# Patient Record
Sex: MALE | Race: WHITE | NOT HISPANIC OR LATINO | URBAN - METROPOLITAN AREA
[De-identification: names, ages, dates, MRNs, and addresses within clinical notes are randomized per-mention and may not be internally consistent; named-entity substitution may affect disease eponyms.]

---

## 2021-01-28 ENCOUNTER — TELEPHONE ENCOUNTER (OUTPATIENT)
Dept: URBAN - METROPOLITAN AREA CLINIC 113 | Facility: CLINIC | Age: 26
End: 2021-01-28

## 2021-02-05 ENCOUNTER — OFFICE VISIT (OUTPATIENT)
Dept: URBAN - METROPOLITAN AREA CLINIC 72 | Facility: CLINIC | Age: 26
End: 2021-02-05
Payer: COMMERCIAL

## 2021-02-05 ENCOUNTER — WEB ENCOUNTER (OUTPATIENT)
Dept: URBAN - METROPOLITAN AREA CLINIC 72 | Facility: CLINIC | Age: 26
End: 2021-02-05

## 2021-02-05 VITALS
HEART RATE: 107 BPM | WEIGHT: 114.6 LBS | RESPIRATION RATE: 17 BRPM | OXYGEN SATURATION: 98 % | DIASTOLIC BLOOD PRESSURE: 80 MMHG | HEIGHT: 67 IN | SYSTOLIC BLOOD PRESSURE: 117 MMHG | BODY MASS INDEX: 17.99 KG/M2 | TEMPERATURE: 97.7 F

## 2021-02-05 DIAGNOSIS — K50.819 CROHN'S DISEASE OF SMALL AND LARGE INTESTINES WITH COMPLICATION: ICD-10-CM

## 2021-02-05 PROCEDURE — G8483 FLU IMM NO ADMIN DOC REA: HCPCS | Performed by: INTERNAL MEDICINE

## 2021-02-05 PROCEDURE — 99204 OFFICE O/P NEW MOD 45 MIN: CPT | Performed by: INTERNAL MEDICINE

## 2021-02-05 PROCEDURE — G8427 DOCREV CUR MEDS BY ELIG CLIN: HCPCS | Performed by: INTERNAL MEDICINE

## 2021-02-05 PROCEDURE — G8418 CALC BMI BLW LOW PARAM F/U: HCPCS | Performed by: INTERNAL MEDICINE

## 2021-02-05 PROCEDURE — 1036F TOBACCO NON-USER: CPT | Performed by: INTERNAL MEDICINE

## 2021-02-05 RX ORDER — PREDNISONE 10 MG/1
AS DIRECTED TABLET ORAL ONCE A DAY
Qty: 150 | Refills: 1 | OUTPATIENT
Start: 2021-02-05 | End: 2021-08-04

## 2021-02-05 NOTE — HPI-TODAY'S VISIT:
Mr. Abbott is a pleasant 25-year-old male who presents as a referral from Dr. Yassine Meyers to establish care for Crohn's disease.  CT scan performed in the emergency department without contrast showed increased intraluminal colonic fluid with multiple colonic air-fluid levels moderate multifocal diffuse colonic distention involving the ascending, transverse, focal concentric thickening in the sigmoid colon and nonspecific gastric distention  he was diagnosed with Crohn's disease many years ago, initially placed on Remicade but all loss.  Source.  Has been trying to maintain with diet.  Notes that this flare in particular began in November, he began experiencing abdominal distention, epigastric pain abdominal pain and weight loss.  He normally is 155 pounds, he is currently down to the wait list of low.  He states that his lowest he was roughly 105 pounds.  He was placed on prednisone he reports that he is feeling somewhat better but still having significant bloating.  He is on a bland low residue diet.  His pain is well controlled currently.bowels are solid currently.  He is on 40 mg of prednisone daily.  Lab work from 1/26/2021 WBC 6.8 hgb 13 Platelet count 526 CMP normal

## 2021-02-05 NOTE — EXAM-PHYSICAL EXAM
General--no acute distress, normal appearance Eyes--anicteric, no pallor HENT--normocephalic, atraumatic head Neck--no lymphadenopathy, non tender Chest--normal breath sounds, equal chest rise Heart--regular rate and rhythm Abdomen--soft, non tender, non distended, bowel sounds present, no organomegaly Musculoskeletal--normal gait and station Skin--no rashes, no jaundice Neurologic--Alert and oriented x 3, answers questions appropriately Psychiatric--stable mood, appropriate affect

## 2021-02-07 ENCOUNTER — WEB ENCOUNTER (OUTPATIENT)
Dept: URBAN - METROPOLITAN AREA CLINIC 72 | Facility: CLINIC | Age: 26
End: 2021-02-07

## 2021-02-11 ENCOUNTER — WEB ENCOUNTER (OUTPATIENT)
Dept: URBAN - METROPOLITAN AREA CLINIC 72 | Facility: CLINIC | Age: 26
End: 2021-02-11

## 2021-03-05 ENCOUNTER — OFFICE VISIT (OUTPATIENT)
Dept: URBAN - METROPOLITAN AREA CLINIC 72 | Facility: CLINIC | Age: 26
End: 2021-03-05
Payer: COMMERCIAL

## 2021-03-05 VITALS
BODY MASS INDEX: 17.89 KG/M2 | DIASTOLIC BLOOD PRESSURE: 75 MMHG | WEIGHT: 114 LBS | HEIGHT: 67 IN | HEART RATE: 94 BPM | TEMPERATURE: 98 F | SYSTOLIC BLOOD PRESSURE: 111 MMHG

## 2021-03-05 DIAGNOSIS — K50.819 CROHN'S DISEASE OF SMALL AND LARGE INTESTINES WITH COMPLICATION: ICD-10-CM

## 2021-03-05 PROCEDURE — 99213 OFFICE O/P EST LOW 20 MIN: CPT | Performed by: INTERNAL MEDICINE

## 2021-03-05 RX ORDER — PREDNISONE 1 MG/1
2 TABLETS WITH FOOD OR MILK TABLET ORAL ONCE A DAY
Qty: 90 | Refills: 0 | OUTPATIENT
Start: 2021-03-05 | End: 2021-04-04

## 2021-03-05 RX ORDER — ADALIMUMAB 40MG/0.4ML
0.4 ML KIT SUBCUTANEOUS
Status: ACTIVE | COMMUNITY

## 2021-03-05 RX ORDER — PREDNISONE 10 MG/1
AS DIRECTED TABLET ORAL ONCE A DAY
Qty: 150 | Refills: 1 | Status: ACTIVE | COMMUNITY
Start: 2021-02-05 | End: 2021-08-04

## 2021-03-05 NOTE — HPI-TODAY'S VISIT:
Mr. Abbott returns for follow-up.  25-year-old male with history of Crohn's disease of the large and small intestine with complication of stricturing.  He was last in our office on 2/5/2021 and patient.  A CT scan performed in the emergency department without contrast that showed increasing intraluminal colonic fluid with multiple colonic air-fluid levels and moderate focal diffuse colonic distention involving the ascending, transverse colon, there was also focal concentric thickening in the sigmoid colon and nonspecific gastric distention.  He was diagnosed with Crohn's many years ago and was initially treated with Remicade but lost his pain or source therefore went off medication.  He has been following a very bland diet to manage his disease.  He notes that he has been a Crohn's flare since November, by abdominal distention, epigastric pain and weight loss.  He normally weighs around 155 pounds and we saw him was 114 pounds, he states that his slow S2 is 105 pounds.  He has a placed on prednisone and reports that he has had some significant improvement but is still having some bloating.  We advised that he continue his prednisone and were working to try to get him some financial assistance for steroid sparing therapy.  he is still taking 40 mg prednisone, he started his Humira and is still loading, plan to begin taper after he takes his first maintenance dose, this should be around March 17.  He placed himself on a carnivore diet, he is only eating meat he notes some improvement his weight has remained stable.  2/8/2021 TP [facility] genetic testing normal Infliximab level 0.4 Infliximab antibody less than 22 QuantiFERON Gold negative Hepatitis B surface antigen negative CRP 31 (high)

## 2021-03-19 ENCOUNTER — TELEPHONE ENCOUNTER (OUTPATIENT)
Dept: URBAN - METROPOLITAN AREA CLINIC 72 | Facility: CLINIC | Age: 26
End: 2021-03-19

## 2021-03-30 ENCOUNTER — TELEPHONE ENCOUNTER (OUTPATIENT)
Dept: URBAN - METROPOLITAN AREA CLINIC 72 | Facility: CLINIC | Age: 26
End: 2021-03-30

## 2021-03-30 RX ORDER — PREDNISONE 1 MG/1
2 TABLETS WITH FOOD OR MILK TABLET ORAL ONCE A DAY
Qty: 90 | Refills: 0
Start: 2021-03-05

## 2021-04-09 ENCOUNTER — OFFICE VISIT (OUTPATIENT)
Dept: URBAN - METROPOLITAN AREA CLINIC 72 | Facility: CLINIC | Age: 26
End: 2021-04-09

## 2021-04-27 ENCOUNTER — TELEPHONE ENCOUNTER (OUTPATIENT)
Dept: URBAN - METROPOLITAN AREA CLINIC 72 | Facility: CLINIC | Age: 26
End: 2021-04-27

## 2021-04-27 RX ORDER — PREDNISONE 1 MG/1
2 TABLETS WITH FOOD OR MILK TABLET ORAL ONCE A DAY
Qty: 30 | Refills: 0
Start: 2021-03-05 | End: 2021-05-27

## 2021-04-29 ENCOUNTER — TELEPHONE ENCOUNTER (OUTPATIENT)
Dept: URBAN - METROPOLITAN AREA SURGERY CENTER 30 | Facility: SURGERY CENTER | Age: 26
End: 2021-04-29

## 2021-04-29 RX ORDER — PREDNISONE 10 MG/1
AS DIRECTED TABLET ORAL ONCE A DAY
Qty: 150 | Refills: 0 | OUTPATIENT

## 2021-05-05 ENCOUNTER — TELEPHONE ENCOUNTER (OUTPATIENT)
Dept: URBAN - METROPOLITAN AREA CLINIC 113 | Facility: CLINIC | Age: 26
End: 2021-05-05

## 2021-05-05 RX ORDER — PREDNISONE 10 MG/1
4 TABLETS TABLET ORAL ONCE A DAY
Qty: 150 TABLET | Refills: 0
End: 2021-08-03

## 2021-05-07 ENCOUNTER — OFFICE VISIT (OUTPATIENT)
Dept: URBAN - METROPOLITAN AREA CLINIC 72 | Facility: CLINIC | Age: 26
End: 2021-05-07
Payer: COMMERCIAL

## 2021-05-07 VITALS
HEART RATE: 100 BPM | TEMPERATURE: 98.6 F | BODY MASS INDEX: 20.28 KG/M2 | RESPIRATION RATE: 18 BRPM | WEIGHT: 129.2 LBS | HEIGHT: 67 IN | SYSTOLIC BLOOD PRESSURE: 122 MMHG | DIASTOLIC BLOOD PRESSURE: 81 MMHG

## 2021-05-07 DIAGNOSIS — K50.819 CROHN'S DISEASE OF SMALL AND LARGE INTESTINES WITH COMPLICATION: ICD-10-CM

## 2021-05-07 PROCEDURE — 99213 OFFICE O/P EST LOW 20 MIN: CPT | Performed by: INTERNAL MEDICINE

## 2021-05-07 RX ORDER — PREDNISONE 1 MG/1
2 TABLETS WITH FOOD OR MILK TABLET ORAL ONCE A DAY
Qty: 30 | Refills: 0 | Status: DISCONTINUED | COMMUNITY
Start: 2021-03-05 | End: 2021-05-27

## 2021-05-07 RX ORDER — PREDNISONE 10 MG/1
3 TABLETS TABLET ORAL ONCE A DAY
Qty: 270 TABLET | Refills: 0 | Status: ACTIVE | COMMUNITY

## 2021-05-07 RX ORDER — ADALIMUMAB 40MG/0.4ML
0.4 ML KIT SUBCUTANEOUS
Status: ACTIVE | COMMUNITY

## 2021-05-07 NOTE — HPI-TODAY'S VISIT:
Mr. Abbott returns for follow-up.  He is a pleasant 26-year-old male with history of Crohn disease of the large and small intestine complicated by stricture.  He was diagnosed with Crohn disease many years ago, initially treated with Remicade however due to lack of payment lost the ability to afford medication.  He therefore treated his condition with diet.  He reports that he has had significant abdominal pain since November 2020, complicated by distention epigastric pain and significant weight loss.  He typically weighs around 155 pounds in load began seeing him he was 114 pounds.  He was seen in the emergency department placed on prednisone and reported significant improvement.  We last saw him he was on 40 mg of prednisone.  He has had issues with affording medications however we were able to get him Humira, he had started this only saw him on 3/5/2021 that was in the loading phase and had not began his maintenance dose. While waiting for his Humira to take effect we have him on steroids with plan to wean. HE is down to 35mg daily. HE is still on carnivore diet. His weight is up to 129lbs from 114lbs.   our workup has been relatively limited secondary to lack of pain or source.  They're working on insurance.  He is feeling somewhat better, still has episodes of bloating, unable to advance diet beyond meats.  Laboratory review: 2/8/2021: T[facility] T genetic testing normal Infliximab level 0.4 Infliximab antibody less than 22 quant gold negative Hepatitis B antigen negative CRP 31

## 2021-05-07 NOTE — EXAM-PHYSICAL EXAM
Patient wearing mask due to COVID-19  General--no acute distress, resting comfortably Eyes--anicteric, no pallor HENT--normocephalic, atraumatic head Neck--no lymphadenopathy, non tender Chest--normal breath sounds, equal rise Heart--regular rate and rhythm Abdomen--soft, non tender, non distended, bowel sounds present, no organomegalyRama increased bowel sounds but no pain

## 2021-06-08 ENCOUNTER — TELEPHONE ENCOUNTER (OUTPATIENT)
Dept: URBAN - METROPOLITAN AREA CLINIC 72 | Facility: CLINIC | Age: 26
End: 2021-06-08

## 2021-06-08 RX ORDER — PREDNISONE 10 MG/1
3 TABLETS TABLET ORAL ONCE A DAY
Qty: 270 TABLET | Refills: 0
End: 2021-09-06

## 2021-06-17 ENCOUNTER — TELEPHONE ENCOUNTER (OUTPATIENT)
Dept: URBAN - METROPOLITAN AREA CLINIC 113 | Facility: CLINIC | Age: 26
End: 2021-06-17

## 2021-06-25 ENCOUNTER — OFFICE VISIT (OUTPATIENT)
Dept: URBAN - METROPOLITAN AREA CLINIC 72 | Facility: CLINIC | Age: 26
End: 2021-06-25

## 2021-06-29 ENCOUNTER — CLAIMS CREATED FROM THE CLAIM WINDOW (OUTPATIENT)
Dept: URBAN - METROPOLITAN AREA MEDICAL CENTER 19 | Facility: MEDICAL CENTER | Age: 26
End: 2021-06-29
Payer: COMMERCIAL

## 2021-06-29 DIAGNOSIS — K50.812 CROHN'S DISEASE OF BOTH SMALL AND LARGE INTESTINE WITH INTESTINAL OBSTRUCTION: ICD-10-CM

## 2021-06-29 DIAGNOSIS — E27.40 ADRENAL INSUFFICIENCY: ICD-10-CM

## 2021-06-29 PROCEDURE — 99254 IP/OBS CNSLTJ NEW/EST MOD 60: CPT | Performed by: INTERNAL MEDICINE

## 2021-08-24 ENCOUNTER — LAB OUTSIDE AN ENCOUNTER (OUTPATIENT)
Dept: URBAN - METROPOLITAN AREA CLINIC 113 | Facility: CLINIC | Age: 26
End: 2021-08-24

## 2021-08-24 ENCOUNTER — OFFICE VISIT (OUTPATIENT)
Dept: URBAN - METROPOLITAN AREA CLINIC 113 | Facility: CLINIC | Age: 26
End: 2021-08-24
Payer: COMMERCIAL

## 2021-08-24 VITALS
HEART RATE: 115 BPM | WEIGHT: 148 LBS | DIASTOLIC BLOOD PRESSURE: 84 MMHG | TEMPERATURE: 98.7 F | RESPIRATION RATE: 20 BRPM | HEIGHT: 67 IN | BODY MASS INDEX: 23.23 KG/M2 | SYSTOLIC BLOOD PRESSURE: 123 MMHG

## 2021-08-24 DIAGNOSIS — K50.819 CROHN'S DISEASE OF SMALL AND LARGE INTESTINES WITH COMPLICATION: ICD-10-CM

## 2021-08-24 PROCEDURE — 99214 OFFICE O/P EST MOD 30 MIN: CPT | Performed by: INTERNAL MEDICINE

## 2021-08-24 RX ORDER — PREDNISONE 10 MG/1
3 TABLETS TABLET ORAL ONCE A DAY
Qty: 270 TABLET | Refills: 0 | Status: ON HOLD | COMMUNITY
End: 2021-09-06

## 2021-08-24 RX ORDER — ADALIMUMAB 40MG/0.4ML
0.4 ML KIT SUBCUTANEOUS
Status: ACTIVE | COMMUNITY

## 2021-08-24 NOTE — HPI-TODAY'S VISIT:
Patient is a very pleasant 26-year-old gentleman who I recently saw was a second opinion at [facility] in regards to Crohn's disease with small bowel obstruction.  He had been diagnosed with Crohn's disease 7 years ago.  He was initially on Remicade and had done well on this.  Then he was started on Humira.  He had been doing a lot of self managing of prednisone at home.  He had been followed by Dr. Darden prior to my seeing him.  We will check Humira levels in the hospital..  Humira level was close to therapeutic at 10.  No antibodies were seen. The patient states has been doing very well since surgery.  He is completely off prednisone.  He is no longer on Humira.  From listening to him it sounds like his mother had Crohn's colitis.  Patient was more specific about his disease course over the years.  He states he was diagnosed at age 28 colonoscopy.  He states he is not had a colonoscopy since that time.  He states he did well on Remicade until he could no longer receive it due to lack of patient assistance.  He only recently started Humira this year but this was after he had longstanding symptoms and had decreased his diet really to just a liquid diet. The patient surgical history this year was that he had surgery in June of this year for small bowel resection with primary anastomosis and stricturoplasty.  He was transferred here due to small bowel obstruction and pneumatosis.  He underwent exploratory lap with ostomy creation.

## 2021-09-10 ENCOUNTER — CLAIMS CREATED FROM THE CLAIM WINDOW (OUTPATIENT)
Dept: URBAN - METROPOLITAN AREA CLINIC 4 | Facility: CLINIC | Age: 26
End: 2021-09-10
Payer: COMMERCIAL

## 2021-09-10 ENCOUNTER — OFFICE VISIT (OUTPATIENT)
Dept: URBAN - METROPOLITAN AREA SURGERY CENTER 25 | Facility: SURGERY CENTER | Age: 26
End: 2021-09-10
Payer: COMMERCIAL

## 2021-09-10 DIAGNOSIS — K63.89 POLYP, HYPERPLASTIC: ICD-10-CM

## 2021-09-10 DIAGNOSIS — K50.80 CROHN'S DISEASE COLON: ICD-10-CM

## 2021-09-10 PROCEDURE — 88305 TISSUE EXAM BY PATHOLOGIST: CPT | Performed by: PATHOLOGY

## 2021-09-10 PROCEDURE — G8907 PT DOC NO EVENTS ON DISCHARG: HCPCS | Performed by: INTERNAL MEDICINE

## 2021-09-10 PROCEDURE — 44382 SMALL BOWEL ENDOSCOPY: CPT | Performed by: INTERNAL MEDICINE

## 2021-09-10 PROCEDURE — 45380 COLONOSCOPY AND BIOPSY: CPT | Performed by: INTERNAL MEDICINE

## 2021-09-10 RX ORDER — ADALIMUMAB 40MG/0.4ML
0.4 ML KIT SUBCUTANEOUS
Status: ACTIVE | COMMUNITY

## 2021-11-10 ENCOUNTER — OFFICE VISIT (OUTPATIENT)
Dept: URBAN - METROPOLITAN AREA CLINIC 113 | Facility: CLINIC | Age: 26
End: 2021-11-10
Payer: COMMERCIAL

## 2021-11-10 VITALS
RESPIRATION RATE: 20 BRPM | HEART RATE: 100 BPM | BODY MASS INDEX: 22.84 KG/M2 | DIASTOLIC BLOOD PRESSURE: 77 MMHG | TEMPERATURE: 98 F | WEIGHT: 145.5 LBS | HEIGHT: 67 IN | SYSTOLIC BLOOD PRESSURE: 124 MMHG

## 2021-11-10 DIAGNOSIS — K50.819 CROHN'S DISEASE OF SMALL AND LARGE INTESTINES WITH COMPLICATION: ICD-10-CM

## 2021-11-10 PROCEDURE — 99214 OFFICE O/P EST MOD 30 MIN: CPT | Performed by: INTERNAL MEDICINE

## 2021-11-10 RX ORDER — ADALIMUMAB 40MG/0.4ML
0.4 ML KIT SUBCUTANEOUS
Status: ACTIVE | COMMUNITY

## 2021-11-10 NOTE — EXAM-PHYSICAL EXAM
He is alert and oriented to person place and situation no acute distress.  There is no scleral icterus.  He has a small eschar over his prior wound otherwise his abdomen is soft with well-healed surgical wounds.

## 2021-11-10 NOTE — HPI-TODAY'S VISIT:
Patient is a very pleasant 26-year-old gentleman who I recently saw was a second opinion at [facility] in regards to Crohn's disease with small bowel obstruction.  He had been diagnosed with Crohn's disease 7 years ago.  He was initially on Remicade and had done well on this.  Then he was started on Humira.  He had been doing a lot of self managing of prednisone at home.  He had been followed by Dr. Darden prior to my seeing him.  We will check Humira levels in the hospital..  Humira level was close to therapeutic at 10.  No antibodies were seen. The patient states has been doing very well since surgery.  He is completely off prednisone.  He is no longer on Humira.  From listening to him it sounds like his mother had Crohn's colitis.  Patient was more specific about his disease course over the years.  He states he was diagnosed at age 28 colonoscopy.  He states he is not had a colonoscopy since that time.  He states he did well on Remicade until he could no longer receive it due to lack of patient assistance.  He only recently started Humira this year but this was after he had longstanding symptoms and had decreased his diet really to just a liquid diet. The patient surgical history this year was that he had surgery in June of this year for small bowel resection with primary anastomosis and stricturoplasty.  He was transferred here due to small bowel obstruction and pneumatosis.  He underwent exploratory lap with ostomy creation. Colonoscopy performed September 10 revealed mildly erythematous with occasional apathy in the entire colon.  Biopsies were obtained.  The distal ileum appeared normal.  Pathology though was unremarkable.  The apathy of the colon were considered prep effect and not evidence for Crohn's disease. Interval history.  The patient has completed reanastomosis.  He is doing well.  He only has a small eschar over the prior ileostomy site.  He has not restarted his Humira as of yet.

## 2021-12-21 ENCOUNTER — TELEPHONE ENCOUNTER (OUTPATIENT)
Dept: URBAN - METROPOLITAN AREA CLINIC 113 | Facility: CLINIC | Age: 26
End: 2021-12-21

## 2021-12-21 RX ORDER — ADALIMUMAB 40MG/0.4ML
1 SUBQ  Q 2 WEEKS KIT SUBCUTANEOUS
Qty: 6 | Refills: 3 | OUTPATIENT
Start: 2021-12-21 | End: 2022-12-16

## 2021-12-21 RX ORDER — ADALIMUMAB 80MG/0.8ML
80MG DAY 1, DAY 2, DAY 15 KIT SUBCUTANEOUS
Qty: 240 MILLIGRAM | Refills: 0 | OUTPATIENT
Start: 2021-12-21 | End: 2022-01-05

## 2021-12-27 ENCOUNTER — WEB ENCOUNTER (OUTPATIENT)
Dept: URBAN - METROPOLITAN AREA CLINIC 113 | Facility: CLINIC | Age: 26
End: 2021-12-27

## 2021-12-27 LAB
HEP B CORE AB, TOT: NEGATIVE
QUANTIFERON CRITERIA: (no result)
QUANTIFERON INCUBATION: (no result)
QUANTIFERON MITOGEN VALUE: >10
QUANTIFERON NIL VALUE: 0.01
QUANTIFERON TB1 AG VALUE: 0.05
QUANTIFERON TB2 AG VALUE: 0.05
QUANTIFERON-TB GOLD PLUS: NEGATIVE

## 2022-01-03 ENCOUNTER — WEB ENCOUNTER (OUTPATIENT)
Dept: URBAN - METROPOLITAN AREA CLINIC 113 | Facility: CLINIC | Age: 27
End: 2022-01-03

## 2022-01-05 ENCOUNTER — WEB ENCOUNTER (OUTPATIENT)
Dept: URBAN - METROPOLITAN AREA CLINIC 113 | Facility: CLINIC | Age: 27
End: 2022-01-05

## 2022-01-06 ENCOUNTER — WEB ENCOUNTER (OUTPATIENT)
Dept: URBAN - METROPOLITAN AREA CLINIC 113 | Facility: CLINIC | Age: 27
End: 2022-01-06

## 2022-01-06 RX ORDER — ADALIMUMAB 40MG/0.4ML
0.4 ML KIT SUBCUTANEOUS
Qty: 6 | Refills: 3

## 2022-01-06 RX ORDER — ADALIMUMAB 80MG/0.8ML
80 MG/0.8 ML KIT SUBCUTANEOUS
Qty: 3 | Refills: 0
Start: 2021-12-21 | End: 2022-01-10

## 2022-01-07 ENCOUNTER — WEB ENCOUNTER (OUTPATIENT)
Dept: URBAN - METROPOLITAN AREA CLINIC 113 | Facility: CLINIC | Age: 27
End: 2022-01-07

## 2022-03-11 ENCOUNTER — OFFICE VISIT (OUTPATIENT)
Dept: URBAN - METROPOLITAN AREA CLINIC 113 | Facility: CLINIC | Age: 27
End: 2022-03-11
Payer: COMMERCIAL

## 2022-03-11 VITALS
DIASTOLIC BLOOD PRESSURE: 79 MMHG | HEART RATE: 89 BPM | BODY MASS INDEX: 27.31 KG/M2 | TEMPERATURE: 97.8 F | HEIGHT: 67 IN | SYSTOLIC BLOOD PRESSURE: 132 MMHG | WEIGHT: 174 LBS

## 2022-03-11 DIAGNOSIS — K50.819 CROHN'S DISEASE OF SMALL AND LARGE INTESTINES WITH COMPLICATION: ICD-10-CM

## 2022-03-11 PROCEDURE — 99213 OFFICE O/P EST LOW 20 MIN: CPT | Performed by: INTERNAL MEDICINE

## 2022-03-11 RX ORDER — ADALIMUMAB 40MG/0.4ML
0.4 ML KIT SUBCUTANEOUS
Qty: 6 | Refills: 3 | Status: ACTIVE | COMMUNITY

## 2022-03-11 NOTE — HPI-TODAY'S VISIT:
Patient is a very pleasant 26-year-old gentleman who I recently saw was a second opinion at [facility] in regards to Crohn's disease with small bowel obstruction.  He had been diagnosed with Crohn's disease 7 years ago.  He was initially on Remicade and had done well on this.  Then he was started on Humira.  He had been doing a lot of self managing of prednisone at home.  He had been followed by Dr. Darden prior to my seeing him.  We will check Humira levels in the hospital..  Humira level was close to therapeutic at 10.  No antibodies were seen. The patient states has been doing very well since surgery.  He is completely off prednisone.  He is no longer on Humira.  From listening to him it sounds like his mother had Crohn's colitis.  Patient was more specific about his disease course over the years.  He states he was diagnosed at age 28 colonoscopy.  He states he is not had a colonoscopy since that time.  He states he did well on Remicade until he could no longer receive it due to lack of patient assistance.  He only recently started Humira this year but this was after he had longstanding symptoms and had decreased his diet really to just a liquid diet. The patient surgical history this year was that he had surgery in June of this year for small bowel resection with primary anastomosis and stricturoplasty.  He was transferred here due to small bowel obstruction and pneumatosis.  He underwent exploratory lap with ostomy creation. Colonoscopy performed September 10 revealed mildly erythematous with occasional apathy in the entire colon.  Biopsies were obtained.  The distal ileum appeared normal.  Pathology though was unremarkable.  The apathy of the colon were considered prep effect and not evidence for Crohn's disease. Interval history.  The patient has completed reanastomosis.  He is doing well.  He only has a small eschar over the prior ileostomy site.  He has not restarted his Humira as of yet. Interval history.  QuantiFERON gold in December was negative.  TPMT testing was negative. The patient is doing very well currently.  He is on Humira.  He decided not to use Imuran.  He and his wife talked it over and they did not like the risks involved and so elected not to proceed with Imuran dosing.  They asked a number of questions in regards to heavy lifting.  I answered all of their questions.

## 2022-12-20 ENCOUNTER — ERX REFILL RESPONSE (OUTPATIENT)
Dept: URBAN - METROPOLITAN AREA CLINIC 113 | Facility: CLINIC | Age: 27
End: 2022-12-20

## 2022-12-20 RX ORDER — ADALIMUMAB 40MG/0.4ML
INJECT 1 PEN UNDER THE SKIN EVERY 14 DAYS KIT SUBCUTANEOUS
Qty: 2 | Refills: 2 | OUTPATIENT

## 2022-12-20 RX ORDER — ADALIMUMAB 40MG/0.4ML
0.4 ML KIT SUBCUTANEOUS
Qty: 6 | Refills: 3 | OUTPATIENT

## 2023-01-04 ENCOUNTER — TELEPHONE ENCOUNTER (OUTPATIENT)
Dept: URBAN - METROPOLITAN AREA CLINIC 113 | Facility: CLINIC | Age: 28
End: 2023-01-04

## 2023-02-10 ENCOUNTER — TELEPHONE ENCOUNTER (OUTPATIENT)
Dept: URBAN - METROPOLITAN AREA CLINIC 113 | Facility: CLINIC | Age: 28
End: 2023-02-10

## 2023-02-17 ENCOUNTER — TELEPHONE ENCOUNTER (OUTPATIENT)
Dept: URBAN - METROPOLITAN AREA CLINIC 113 | Facility: CLINIC | Age: 28
End: 2023-02-17

## 2023-02-17 ENCOUNTER — OFFICE VISIT (OUTPATIENT)
Dept: URBAN - METROPOLITAN AREA CLINIC 113 | Facility: CLINIC | Age: 28
End: 2023-02-17
Payer: COMMERCIAL

## 2023-02-17 ENCOUNTER — LAB OUTSIDE AN ENCOUNTER (OUTPATIENT)
Dept: URBAN - METROPOLITAN AREA CLINIC 113 | Facility: CLINIC | Age: 28
End: 2023-02-17

## 2023-02-17 VITALS
SYSTOLIC BLOOD PRESSURE: 146 MMHG | HEIGHT: 67 IN | HEART RATE: 75 BPM | BODY MASS INDEX: 23.54 KG/M2 | RESPIRATION RATE: 16 BRPM | TEMPERATURE: 97.1 F | WEIGHT: 150 LBS | DIASTOLIC BLOOD PRESSURE: 76 MMHG

## 2023-02-17 DIAGNOSIS — K50.819 CROHN'S DISEASE OF SMALL AND LARGE INTESTINES WITH COMPLICATION: ICD-10-CM

## 2023-02-17 PROBLEM — 71833008: Status: ACTIVE | Noted: 2021-02-05

## 2023-02-17 PROCEDURE — 99214 OFFICE O/P EST MOD 30 MIN: CPT | Performed by: INTERNAL MEDICINE

## 2023-02-17 RX ORDER — ADALIMUMAB 40MG/0.4ML
INJECT 1 PEN UNDER THE SKIN EVERY 14 DAYS KIT SUBCUTANEOUS
Qty: 2 | Refills: 2 | Status: ACTIVE | COMMUNITY

## 2023-02-17 RX ORDER — SODIUM, POTASSIUM,MAG SULFATES 17.5-3.13G
354ML SOLUTION, RECONSTITUTED, ORAL ORAL
Qty: 354 MILLILITER | Refills: 0 | OUTPATIENT
Start: 2023-02-17 | End: 2023-02-18

## 2023-02-17 NOTE — HPI-TODAY'S VISIT:
Patient is a very pleasant 26-year-old gentleman who I recently saw was a second opinion at [facility] in regards to Crohn's disease with small bowel obstruction.  He had been diagnosed with Crohn's disease 7 years ago.  He was initially on Remicade and had done well on this.  Then he was started on Humira.  He had been doing a lot of self managing of prednisone at home.  He had been followed by Dr. Darden prior to my seeing him.  We will check Humira levels in the hospital..  Humira level was close to therapeutic at 10.  No antibodies were seen. The patient states has been doing very well since surgery.  He is completely off prednisone.  He is no longer on Humira.  From listening to him it sounds like his mother had Crohn's colitis.  Patient was more specific about his disease course over the years.  He states he was diagnosed at age 28 colonoscopy.  He states he is not had a colonoscopy since that time.  He states he did well on Remicade until he could no longer receive it due to lack of patient assistance.  He only recently started Humira this year but this was after he had longstanding symptoms and had decreased his diet really to just a liquid diet. The patient surgical history this year was that he had surgery in June of this year for small bowel resection with primary anastomosis and stricturoplasty.  He was transferred here due to small bowel obstruction and pneumatosis.  He underwent exploratory lap with ostomy creation. Colonoscopy performed September 10 revealed mildly erythematous with occasional apathy in the entire colon.  Biopsies were obtained.  The distal ileum appeared normal.  Pathology though was unremarkable.  The apathy of the colon were considered prep effect and not evidence for Crohn's disease. Interval history.  The patient has completed reanastomosis.  He is doing well.  He only has a small eschar over the prior ileostomy site.  He has not restarted his Humira as of yet. Interval history.  QuantiFERON gold in December was negative.  TPMT testing was negative. The patient is doing very well currently.  He is on Humira.  He decided not to use Imuran.  He and his wife talked it over and they did not like the risks involved and so elected not to proceed with Imuran dosing.  They asked a number of questions in regards to heavy lifting.  I answered all of their questions. Interval history, 2/17/2023.  The patient has been doing very well.  He had laboratory testing done recently unfortunately we do not have those results.  We will request them.  They were done last month.

## 2023-04-07 ENCOUNTER — OFFICE VISIT (OUTPATIENT)
Dept: URBAN - METROPOLITAN AREA SURGERY CENTER 25 | Facility: SURGERY CENTER | Age: 28
End: 2023-04-07

## 2023-04-18 ENCOUNTER — ERX REFILL RESPONSE (OUTPATIENT)
Dept: URBAN - METROPOLITAN AREA CLINIC 113 | Facility: CLINIC | Age: 28
End: 2023-04-18

## 2023-04-18 RX ORDER — ADALIMUMAB 40MG/0.4ML
INJECT 1 PEN UNDER THE SKIN EVERY 14 DAYS KIT SUBCUTANEOUS
Qty: 2 | Refills: 11 | OUTPATIENT

## 2023-04-18 RX ORDER — ADALIMUMAB 40MG/0.4ML
INJECT 1 PEN UNDER THE SKIN EVERY 14 DAYS KIT SUBCUTANEOUS
Qty: 2 | Refills: 2 | OUTPATIENT

## 2023-04-27 ENCOUNTER — OFFICE VISIT (OUTPATIENT)
Dept: URBAN - METROPOLITAN AREA CLINIC 113 | Facility: CLINIC | Age: 28
End: 2023-04-27

## 2023-05-09 ENCOUNTER — TELEPHONE ENCOUNTER (OUTPATIENT)
Dept: URBAN - METROPOLITAN AREA CLINIC 113 | Facility: CLINIC | Age: 28
End: 2023-05-09

## 2023-05-15 ENCOUNTER — TELEPHONE ENCOUNTER (OUTPATIENT)
Dept: URBAN - METROPOLITAN AREA CLINIC 113 | Facility: CLINIC | Age: 28
End: 2023-05-15

## 2023-05-17 ENCOUNTER — TELEPHONE ENCOUNTER (OUTPATIENT)
Dept: URBAN - METROPOLITAN AREA CLINIC 113 | Facility: CLINIC | Age: 28
End: 2023-05-17

## 2023-11-29 ENCOUNTER — TELEPHONE ENCOUNTER (OUTPATIENT)
Dept: URBAN - METROPOLITAN AREA CLINIC 113 | Facility: CLINIC | Age: 28
End: 2023-11-29

## 2024-02-08 ENCOUNTER — OV EP (OUTPATIENT)
Dept: URBAN - METROPOLITAN AREA CLINIC 113 | Facility: CLINIC | Age: 29
End: 2024-02-08

## 2024-02-21 ENCOUNTER — LAB (OUTPATIENT)
Dept: URBAN - METROPOLITAN AREA CLINIC 113 | Facility: CLINIC | Age: 29
End: 2024-02-21

## 2024-02-21 ENCOUNTER — OV EP (OUTPATIENT)
Dept: URBAN - METROPOLITAN AREA CLINIC 113 | Facility: CLINIC | Age: 29
End: 2024-02-21
Payer: COMMERCIAL

## 2024-02-21 VITALS
BODY MASS INDEX: 23.54 KG/M2 | HEIGHT: 67 IN | RESPIRATION RATE: 16 BRPM | WEIGHT: 150 LBS | HEART RATE: 82 BPM | DIASTOLIC BLOOD PRESSURE: 77 MMHG | SYSTOLIC BLOOD PRESSURE: 140 MMHG | TEMPERATURE: 97.5 F

## 2024-02-21 DIAGNOSIS — K50.819 CROHN'S DISEASE OF SMALL AND LARGE INTESTINES WITH COMPLICATION: ICD-10-CM

## 2024-02-21 PROCEDURE — 99214 OFFICE O/P EST MOD 30 MIN: CPT | Performed by: INTERNAL MEDICINE

## 2024-02-21 RX ORDER — ADALIMUMAB 40MG/0.4ML
INJECT 1 PEN UNDER THE SKIN EVERY 14 DAYS KIT SUBCUTANEOUS
Qty: 2 | Refills: 11 | Status: ACTIVE | COMMUNITY

## 2024-02-21 NOTE — HPI-TODAY'S VISIT:
Patient is a very pleasant 26-year-old gentleman who I recently saw was a second opinion at [facility] in regards to Crohn's disease with small bowel obstruction.  He had been diagnosed with Crohn's disease 7 years ago.  He was initially on Remicade and had done well on this.  Then he was started on Humira.  He had been doing a lot of self managing of prednisone at home.  He had been followed by Dr. Darden prior to my seeing him.  We will check Humira levels in the hospital..  Humira level was close to therapeutic at 10.  No antibodies were seen. The patient states has been doing very well since surgery.  He is completely off prednisone.  He is no longer on Humira.  From listening to him it sounds like his mother had Crohn's colitis.  Patient was more specific about his disease course over the years.  He states he was diagnosed at age 28 colonoscopy.  He states he is not had a colonoscopy since that time.  He states he did well on Remicade until he could no longer receive it due to lack of patient assistance.  He only recently started Humira this year but this was after he had longstanding symptoms and had decreased his diet really to just a liquid diet. The patient surgical history this year was that he had surgery in June of this year for small bowel resection with primary anastomosis and stricturoplasty.  He was transferred here due to small bowel obstruction and pneumatosis.  He underwent exploratory lap with ostomy creation. Colonoscopy performed September 10 revealed mildly erythematous with occasional apathy in the entire colon.  Biopsies were obtained.  The distal ileum appeared normal.  Pathology though was unremarkable.  The apathy of the colon were considered prep effect and not evidence for Crohn's disease. Interval history.  The patient has completed reanastomosis.  He is doing well.  He only has a small eschar over the prior ileostomy site.  He has not restarted his Humira as of yet. Interval history.  QuantiFERON gold in December was negative.  TPMT testing was negative. The patient is doing very well currently.  He is on Humira.  He decided not to use Imuran.  He and his wife talked it over and they did not like the risks involved and so elected not to proceed with Imuran dosing.  They asked a number of questions in regards to heavy lifting.  I answered all of their questions. Interval history, 2/17/2023.  The patient has been doing very well.  He had laboratory testing done recently unfortunately we do not have those results.  We will request them.  They were done last month. Interval history, 2/21/2024.  Referring records were reviewed.  CT scan of the abdomen and pelvis with contrast performed in October 2023 revealed small ventral hernia.  Laboratory testing from January 27 of last month revealed a normal CBC, normal CRP. The patient states he is scheduled next week for surgery for his postoperative hernia.  He states he did have his Humira level done along with his hepatitis B and TB testing but unfortunately we did not receive this from [facility].  We will request these results.  Patient states from a general health standpoint he is done very well.  He has no diarrhea.  No abdominal pain.  He states his CT scan performed back in October was a preoperative evaluation in regards to his hernia.

## 2024-12-13 ENCOUNTER — TELEPHONE ENCOUNTER (OUTPATIENT)
Dept: URBAN - METROPOLITAN AREA CLINIC 113 | Facility: CLINIC | Age: 29
End: 2024-12-13

## 2024-12-13 RX ORDER — ADALIMUMAB 40MG/0.4ML
40 MG/0.4 ML KIT SUBCUTANEOUS EVERY 2 WEEKS
Qty: 4 | Refills: 6
End: 2026-09-04

## 2024-12-17 ENCOUNTER — TELEPHONE ENCOUNTER (OUTPATIENT)
Dept: URBAN - METROPOLITAN AREA CLINIC 113 | Facility: CLINIC | Age: 29
End: 2024-12-17

## 2024-12-20 ENCOUNTER — TELEPHONE ENCOUNTER (OUTPATIENT)
Dept: URBAN - METROPOLITAN AREA CLINIC 113 | Facility: CLINIC | Age: 29
End: 2024-12-20

## 2024-12-20 RX ORDER — ADALIMUMAB 40MG/0.4ML
40 MG/0.4 ML KIT SUBCUTANEOUS EVERY 2 WEEKS
Qty: 4 | Refills: 6
End: 2026-09-11

## 2024-12-27 ENCOUNTER — TELEPHONE ENCOUNTER (OUTPATIENT)
Dept: URBAN - METROPOLITAN AREA CLINIC 113 | Facility: CLINIC | Age: 29
End: 2024-12-27

## 2025-01-03 ENCOUNTER — TELEPHONE ENCOUNTER (OUTPATIENT)
Dept: URBAN - METROPOLITAN AREA CLINIC 113 | Facility: CLINIC | Age: 30
End: 2025-01-03

## 2025-01-03 RX ORDER — ADALIMUMAB-ATTO 40 MG/.4ML
0.4 ML INJECTION SUBCUTANEOUS EVERY OTHER WEEK
Qty: 6 | Refills: 4 | OUTPATIENT
Start: 2025-01-03 | End: 2026-03-29

## 2025-02-28 ENCOUNTER — TELEPHONE ENCOUNTER (OUTPATIENT)
Dept: URBAN - METROPOLITAN AREA CLINIC 113 | Facility: CLINIC | Age: 30
End: 2025-02-28

## 2025-03-04 ENCOUNTER — TELEPHONE ENCOUNTER (OUTPATIENT)
Dept: URBAN - METROPOLITAN AREA CLINIC 113 | Facility: CLINIC | Age: 30
End: 2025-03-04

## 2025-03-04 RX ORDER — ADALIMUMAB-ATTO 40 MG/.4ML
0.4 ML INJECTION SUBCUTANEOUS EVERY OTHER WEEK
Qty: 6 | Refills: 4
Start: 2025-01-03 | End: 2026-05-28

## 2025-03-05 ENCOUNTER — TELEPHONE ENCOUNTER (OUTPATIENT)
Dept: URBAN - METROPOLITAN AREA CLINIC 113 | Facility: CLINIC | Age: 30
End: 2025-03-05

## 2025-03-07 ENCOUNTER — TELEPHONE ENCOUNTER (OUTPATIENT)
Dept: URBAN - METROPOLITAN AREA CLINIC 113 | Facility: CLINIC | Age: 30
End: 2025-03-07

## 2025-03-07 RX ORDER — ADALIMUMAB-ATTO 40 MG/.4ML
0.4 ML INJECTION SUBCUTANEOUS EVERY OTHER WEEK
Qty: 6 | Refills: 4
Start: 2025-01-03 | End: 2026-05-31

## 2025-04-30 ENCOUNTER — LAB OUTSIDE AN ENCOUNTER (OUTPATIENT)
Dept: URBAN - METROPOLITAN AREA CLINIC 113 | Facility: CLINIC | Age: 30
End: 2025-04-30

## 2025-04-30 ENCOUNTER — DASHBOARD ENCOUNTERS (OUTPATIENT)
Age: 30
End: 2025-04-30

## 2025-04-30 ENCOUNTER — TELEPHONE ENCOUNTER (OUTPATIENT)
Dept: URBAN - METROPOLITAN AREA CLINIC 113 | Facility: CLINIC | Age: 30
End: 2025-04-30

## 2025-04-30 ENCOUNTER — OFFICE VISIT (OUTPATIENT)
Dept: URBAN - METROPOLITAN AREA CLINIC 113 | Facility: CLINIC | Age: 30
End: 2025-04-30

## 2025-04-30 RX ORDER — ADALIMUMAB-ATTO 40 MG/.4ML
0.4 ML INJECTION SUBCUTANEOUS EVERY OTHER WEEK
Qty: 6 | Refills: 4 | Status: ON HOLD | COMMUNITY
Start: 2025-01-03 | End: 2026-05-31

## 2025-04-30 RX ORDER — ADALIMUMAB 40MG/0.4ML
40 MG/0.4 ML KIT SUBCUTANEOUS EVERY 2 WEEKS
Qty: 4 | Refills: 6 | Status: ON HOLD | COMMUNITY
End: 2026-09-11

## 2025-04-30 NOTE — HPI-TODAY'S VISIT:
Patient is a very pleasant 26-year-old gentleman who I recently saw was a second opinion at [facility] in regards to Crohn's disease with small bowel obstruction.  He had been diagnosed with Crohn's disease 7 years ago.  He was initially on Remicade and had done well on this.  Then he was started on Humira.  He had been doing a lot of self managing of prednisone at home.  He had been followed by Dr. Darden prior to my seeing him.  We will check Humira levels in the hospital..  Humira level was close to therapeutic at 10.  No antibodies were seen. The patient states has been doing very well since surgery.  He is completely off prednisone.  He is no longer on Humira.  From listening to him it sounds like his mother had Crohn's colitis.  Patient was more specific about his disease course over the years.  He states he was diagnosed at age 28 colonoscopy.  He states he is not had a colonoscopy since that time.  He states he did well on Remicade until he could no longer receive it due to lack of patient assistance.  He only recently started Humira this year but this was after he had longstanding symptoms and had decreased his diet really to just a liquid diet. The patient surgical history this year was that he had surgery in June of this year for small bowel resection with primary anastomosis and stricturoplasty.  He was transferred here due to small bowel obstruction and pneumatosis.  He underwent exploratory lap with ostomy creation. Colonoscopy performed September 10 revealed mildly erythematous with occasional apathy in the entire colon.  Biopsies were obtained.  The distal ileum appeared normal.  Pathology though was unremarkable.  The apathy of the colon were considered prep effect and not evidence for Crohn's disease. Interval history.  The patient has completed reanastomosis.  He is doing well.  He only has a small eschar over the prior ileostomy site.  He has not restarted his Humira as of yet. Interval history.  QuantiFERON gold in December was negative.  TPMT testing was negative. The patient is doing very well currently.  He is on Humira.  He decided not to use Imuran.  He and his wife talked it over and they did not like the risks involved and so elected not to proceed with Imuran dosing.  They asked a number of questions in regards to heavy lifting.  I answered all of their questions. Interval history, 2/17/2023.  The patient has been doing very well.  He had laboratory testing done recently unfortunately we do not have those results.  We will request them.  They were done last month. Interval history, 2/21/2024.  Referring records were reviewed.  CT scan of the abdomen and pelvis with contrast performed in October 2023 revealed small ventral hernia.  Laboratory testing from January 27 of last month revealed a normal CBC, normal CRP. The patient states he is scheduled next week for surgery for his postoperative hernia.  He states he did have his Humira level done along with his hepatitis B and TB testing but unfortunately we did not receive this from [facility].  We will request these results.  Patient states from a general health standpoint he is done very well.  He has no diarrhea.  No abdominal pain.  He states his CT scan performed back in October was a preoperative evaluation in regards to his hernia. Interval history, 4/30/2025. Laboratory testing from 1 year ago showed a negative QuantiFERON gold.  Humira level at that time was 7.0 with no antibodies.  Laboratory testing from February 2024 revealed a normal BMP and CBC. The patient states that his insurance company wished him to switch from Humira to a generic brand.  We made appropriate request for the generic medication.  Unfortunately his insurance company made a disastrous malpractice decision to discontinue his medication stating he did not need it.  They brought the referral letter which states that they see no reason for him to be on medication.  He is now beginning to develop symptoms.  He has some perianal leakage and some loose stools and occasional abdominal cramping.  He is becoming increasingly worried that he is going to have a repeat full-blown flare of his Crohn's disease.

## 2025-05-06 ENCOUNTER — TELEPHONE ENCOUNTER (OUTPATIENT)
Dept: URBAN - METROPOLITAN AREA CLINIC 113 | Facility: CLINIC | Age: 30
End: 2025-05-06

## 2025-05-07 ENCOUNTER — P2P PATIENT RECORD (OUTPATIENT)
Age: 30
End: 2025-05-07

## 2025-05-09 ENCOUNTER — TELEPHONE ENCOUNTER (OUTPATIENT)
Dept: URBAN - METROPOLITAN AREA CLINIC 113 | Facility: CLINIC | Age: 30
End: 2025-05-09

## 2025-05-09 RX ORDER — ADALIMUMAB-ATTO 80 MG/.8ML
160MG INJECTION SUBCUTANEOUS ONCE
Qty: 3 | Refills: 0 | OUTPATIENT
Start: 2025-05-13 | End: 2025-05-15

## 2025-05-09 RX ORDER — ADALIMUMAB-ATTO 40 MG/.4ML
TAKE AS DIRECTED: INJECTION SUBCUTANEOUS EVERY OTHER WEEK
Qty: 12 | Refills: 4
Start: 2025-05-02 | End: 2026-08-06

## 2025-05-13 ENCOUNTER — P2P PATIENT RECORD (OUTPATIENT)
Age: 30
End: 2025-05-13

## 2025-05-21 ENCOUNTER — OFFICE VISIT (OUTPATIENT)
Dept: URBAN - METROPOLITAN AREA SURGERY CENTER 25 | Facility: SURGERY CENTER | Age: 30
End: 2025-05-21

## 2025-05-22 ENCOUNTER — P2P PATIENT RECORD (OUTPATIENT)
Age: 30
End: 2025-05-22

## 2025-05-30 ENCOUNTER — OFFICE VISIT (OUTPATIENT)
Dept: URBAN - METROPOLITAN AREA SURGERY CENTER 25 | Facility: SURGERY CENTER | Age: 30
End: 2025-05-30

## 2025-05-30 RX ORDER — ADALIMUMAB-ATTO 40 MG/.4ML
TAKE AS DIRECTED: INJECTION SUBCUTANEOUS EVERY OTHER WEEK
Qty: 12 | Refills: 4 | Status: ACTIVE | COMMUNITY
Start: 2025-05-02 | End: 2026-08-06

## 2025-05-30 RX ORDER — ADALIMUMAB 40MG/0.4ML
40 MG/0.4 ML KIT SUBCUTANEOUS EVERY 2 WEEKS
Qty: 4 | Refills: 6 | Status: ON HOLD | COMMUNITY
End: 2026-09-11

## 2025-05-30 RX ORDER — ADALIMUMAB-ATTO 40 MG/.4ML
0.4 ML INJECTION SUBCUTANEOUS EVERY OTHER WEEK
Qty: 6 | Refills: 4 | Status: ON HOLD | COMMUNITY
Start: 2025-01-03 | End: 2026-05-31

## 2025-06-18 ENCOUNTER — OFFICE VISIT (OUTPATIENT)
Dept: URBAN - METROPOLITAN AREA CLINIC 113 | Facility: CLINIC | Age: 30
End: 2025-06-18
Payer: COMMERCIAL

## 2025-06-18 DIAGNOSIS — K50.819 CROHN'S DISEASE OF SMALL AND LARGE INTESTINES WITH COMPLICATION: ICD-10-CM

## 2025-06-18 PROCEDURE — 99213 OFFICE O/P EST LOW 20 MIN: CPT | Performed by: INTERNAL MEDICINE

## 2025-06-18 RX ORDER — ADALIMUMAB-ATTO 40 MG/.4ML
TAKE AS DIRECTED: INJECTION SUBCUTANEOUS EVERY OTHER WEEK
Qty: 12 | Refills: 4 | Status: ACTIVE | COMMUNITY
Start: 2025-05-02 | End: 2026-08-06

## 2025-06-18 RX ORDER — ADALIMUMAB 40MG/0.4ML
40 MG/0.4 ML KIT SUBCUTANEOUS EVERY 2 WEEKS
Qty: 4 | Refills: 6 | Status: ON HOLD | COMMUNITY
End: 2026-09-11

## 2025-06-18 RX ORDER — ADALIMUMAB-ATTO 40 MG/.4ML
0.4 ML INJECTION SUBCUTANEOUS EVERY OTHER WEEK
Qty: 6 | Refills: 4 | Status: ON HOLD | COMMUNITY
Start: 2025-01-03 | End: 2026-05-31

## 2025-06-18 NOTE — HPI-TODAY'S VISIT:
Patient is a very pleasant 26-year-old gentleman who I recently saw was a second opinion at [facility] in regards to Crohn's disease with small bowel obstruction.  He had been diagnosed with Crohn's disease 7 years ago.  He was initially on Remicade and had done well on this.  Then he was started on Humira.  He had been doing a lot of self managing of prednisone at home.  He had been followed by Dr. Darden prior to my seeing him.  We will check Humira levels in the hospital..  Humira level was close to therapeutic at 10.  No antibodies were seen. The patient states has been doing very well since surgery.  He is completely off prednisone.  He is no longer on Humira.  From listening to him it sounds like his mother had Crohn's colitis.  Patient was more specific about his disease course over the years.  He states he was diagnosed at age 28 colonoscopy.  He states he is not had a colonoscopy since that time.  He states he did well on Remicade until he could no longer receive it due to lack of patient assistance.  He only recently started Humira this year but this was after he had longstanding symptoms and had decreased his diet really to just a liquid diet. The patient surgical history this year was that he had surgery in June of this year for small bowel resection with primary anastomosis and stricturoplasty.  He was transferred here due to small bowel obstruction and pneumatosis.  He underwent exploratory lap with ostomy creation. Colonoscopy performed September 10 revealed mildly erythematous with occasional apathy in the entire colon.  Biopsies were obtained.  The distal ileum appeared normal.  Pathology though was unremarkable.  The apathy of the colon were considered prep effect and not evidence for Crohn's disease. Interval history.  The patient has completed reanastomosis.  He is doing well.  He only has a small eschar over the prior ileostomy site.  He has not restarted his Humira as of yet. Interval history.  QuantiFERON gold in December was negative.  TPMT testing was negative. The patient is doing very well currently.  He is on Humira.  He decided not to use Imuran.  He and his wife talked it over and they did not like the risks involved and so elected not to proceed with Imuran dosing.  They asked a number of questions in regards to heavy lifting.  I answered all of their questions. Interval history, 2/17/2023.  The patient has been doing very well.  He had laboratory testing done recently unfortunately we do not have those results.  We will request them.  They were done last month. Interval history, 2/21/2024.  Referring records were reviewed.  CT scan of the abdomen and pelvis with contrast performed in October 2023 revealed small ventral hernia.  Laboratory testing from January 27 of last month revealed a normal CBC, normal CRP. The patient states he is scheduled next week for surgery for his postoperative hernia.  He states he did have his Humira level done along with his hepatitis B and TB testing but unfortunately we did not receive this from [facility].  We will request these results.  Patient states from a general health standpoint he is done very well.  He has no diarrhea.  No abdominal pain.  He states his CT scan performed back in October was a preoperative evaluation in regards to his hernia. Interval history, 4/30/2025. Laboratory testing from 1 year ago showed a negative QuantiFERON gold.  Humira level at that time was 7.0 with no antibodies.  Laboratory testing from February 2024 revealed a normal BMP and CBC. The patient states that his insurance company wished him to switch from Humira to a generic brand.  We made appropriate request for the generic medication.  Unfortunately his insurance company made a disastrous malpractice decision to discontinue his medication stating he did not need it.  They brought the referral letter which states that they see no reason for him to be on medication.  He is now beginning to develop symptoms.  He has some perianal leakage and some loose stools and occasional abdominal cramping.  He is becoming increasingly worried that he is going to have a repeat full-blown flare of his Crohn's disease. Interval history, 6/18/2025. Colonoscopy performed May 30 of last month for disease activity assessment revealed a few erosions around his anastomosis more consistent with anastomotic changes rather than Crohn's disease.  He had a fairly long blind limb.  He did have some mild mucosal changes of the very distal rectum possibly consistent with Crohn's disease.  Biopsies of the rectum were consistent with this but no granulomas. The patient states since getting back on medication and being rebolused he is doing extremely well.  Symptoms have resolved.  He feels very well.